# Patient Record
Sex: FEMALE | Race: BLACK OR AFRICAN AMERICAN | Employment: FULL TIME | URBAN - METROPOLITAN AREA
[De-identification: names, ages, dates, MRNs, and addresses within clinical notes are randomized per-mention and may not be internally consistent; named-entity substitution may affect disease eponyms.]

---

## 2021-03-26 ENCOUNTER — HOSPITAL ENCOUNTER (EMERGENCY)
Facility: CLINIC | Age: 46
Discharge: HOME | End: 2021-03-26
Attending: EMERGENCY MEDICINE | Admitting: EMERGENCY MEDICINE

## 2021-03-26 VITALS
OXYGEN SATURATION: 98 % | DIASTOLIC BLOOD PRESSURE: 102 MMHG | HEART RATE: 74 BPM | TEMPERATURE: 98 F | SYSTOLIC BLOOD PRESSURE: 160 MMHG | RESPIRATION RATE: 14 BRPM

## 2021-03-26 DIAGNOSIS — I16.0 HYPERTENSIVE URGENCY: ICD-10-CM

## 2021-03-26 LAB
ALBUMIN SERPL-MCNC: 4.3 G/DL (ref 3.4–5)
ALP SERPL-CCNC: 93 U/L (ref 40–150)
ALT SERPL W P-5'-P-CCNC: 24 U/L (ref 0–50)
ANION GAP SERPL CALCULATED.3IONS-SCNC: 3 MMOL/L (ref 3–14)
AST SERPL W P-5'-P-CCNC: 17 U/L (ref 0–45)
BASOPHILS # BLD AUTO: 0 10E9/L (ref 0–0.2)
BASOPHILS NFR BLD AUTO: 0.4 %
BILIRUB DIRECT SERPL-MCNC: 0.2 MG/DL (ref 0–0.2)
BILIRUB SERPL-MCNC: 0.7 MG/DL (ref 0.2–1.3)
BUN SERPL-MCNC: 9 MG/DL (ref 7–30)
CALCIUM SERPL-MCNC: 8.9 MG/DL (ref 8.5–10.1)
CHLORIDE SERPL-SCNC: 104 MMOL/L (ref 94–109)
CO2 SERPL-SCNC: 28 MMOL/L (ref 20–32)
CREAT SERPL-MCNC: 0.9 MG/DL (ref 0.52–1.04)
DIFFERENTIAL METHOD BLD: NORMAL
EOSINOPHIL # BLD AUTO: 0.2 10E9/L (ref 0–0.7)
EOSINOPHIL NFR BLD AUTO: 2.2 %
ERYTHROCYTE [DISTWIDTH] IN BLOOD BY AUTOMATED COUNT: 12.9 % (ref 10–15)
GFR SERPL CREATININE-BSD FRML MDRD: 75 ML/MIN/{1.73_M2}
GLUCOSE SERPL-MCNC: 112 MG/DL (ref 70–99)
HCT VFR BLD AUTO: 42.2 % (ref 35–47)
HGB BLD-MCNC: 14.2 G/DL (ref 11.7–15.7)
IMM GRANULOCYTES # BLD: 0 10E9/L (ref 0–0.4)
IMM GRANULOCYTES NFR BLD: 0.3 %
INTERPRETATION ECG - MUSE: NORMAL
LIPASE SERPL-CCNC: 115 U/L (ref 73–393)
LYMPHOCYTES # BLD AUTO: 3.5 10E9/L (ref 0.8–5.3)
LYMPHOCYTES NFR BLD AUTO: 35.4 %
MCH RBC QN AUTO: 32.3 PG (ref 26.5–33)
MCHC RBC AUTO-ENTMCNC: 33.6 G/DL (ref 31.5–36.5)
MCV RBC AUTO: 96 FL (ref 78–100)
MONOCYTES # BLD AUTO: 0.7 10E9/L (ref 0–1.3)
MONOCYTES NFR BLD AUTO: 6.7 %
NEUTROPHILS # BLD AUTO: 5.5 10E9/L (ref 1.6–8.3)
NEUTROPHILS NFR BLD AUTO: 55 %
NRBC # BLD AUTO: 0 10*3/UL
NRBC BLD AUTO-RTO: 0 /100
PLATELET # BLD AUTO: 332 10E9/L (ref 150–450)
POTASSIUM SERPL-SCNC: 3.5 MMOL/L (ref 3.4–5.3)
PROT SERPL-MCNC: 8.4 G/DL (ref 6.8–8.8)
RBC # BLD AUTO: 4.39 10E12/L (ref 3.8–5.2)
SODIUM SERPL-SCNC: 135 MMOL/L (ref 133–144)
TROPONIN I SERPL-MCNC: <0.015 UG/L (ref 0–0.04)
WBC # BLD AUTO: 9.9 10E9/L (ref 4–11)

## 2021-03-26 PROCEDURE — 83690 ASSAY OF LIPASE: CPT | Performed by: EMERGENCY MEDICINE

## 2021-03-26 PROCEDURE — 84484 ASSAY OF TROPONIN QUANT: CPT | Performed by: EMERGENCY MEDICINE

## 2021-03-26 PROCEDURE — 85025 COMPLETE CBC W/AUTO DIFF WBC: CPT | Performed by: EMERGENCY MEDICINE

## 2021-03-26 PROCEDURE — 96374 THER/PROPH/DIAG INJ IV PUSH: CPT

## 2021-03-26 PROCEDURE — 80076 HEPATIC FUNCTION PANEL: CPT | Performed by: EMERGENCY MEDICINE

## 2021-03-26 PROCEDURE — 96375 TX/PRO/DX INJ NEW DRUG ADDON: CPT

## 2021-03-26 PROCEDURE — 99284 EMERGENCY DEPT VISIT MOD MDM: CPT | Mod: 25

## 2021-03-26 PROCEDURE — 93005 ELECTROCARDIOGRAM TRACING: CPT

## 2021-03-26 PROCEDURE — 80048 BASIC METABOLIC PNL TOTAL CA: CPT | Performed by: EMERGENCY MEDICINE

## 2021-03-26 PROCEDURE — 250N000011 HC RX IP 250 OP 636: Performed by: EMERGENCY MEDICINE

## 2021-03-26 RX ORDER — LABETALOL 100 MG/1
100 TABLET, FILM COATED ORAL 2 TIMES DAILY
Qty: 60 TABLET | Refills: 0 | Status: SHIPPED | OUTPATIENT
Start: 2021-03-26

## 2021-03-26 RX ORDER — DEXAMETHASONE SODIUM PHOSPHATE 10 MG/ML
10 INJECTION, SOLUTION INTRAMUSCULAR; INTRAVENOUS ONCE
Status: COMPLETED | OUTPATIENT
Start: 2021-03-26 | End: 2021-03-26

## 2021-03-26 RX ORDER — ONDANSETRON 2 MG/ML
4 INJECTION INTRAMUSCULAR; INTRAVENOUS ONCE
Status: COMPLETED | OUTPATIENT
Start: 2021-03-26 | End: 2021-03-26

## 2021-03-26 RX ORDER — LABETALOL HYDROCHLORIDE 5 MG/ML
20 INJECTION, SOLUTION INTRAVENOUS ONCE
Status: COMPLETED | OUTPATIENT
Start: 2021-03-26 | End: 2021-03-26

## 2021-03-26 RX ORDER — DIPHENHYDRAMINE HYDROCHLORIDE 50 MG/ML
50 INJECTION INTRAMUSCULAR; INTRAVENOUS ONCE
Status: COMPLETED | OUTPATIENT
Start: 2021-03-26 | End: 2021-03-26

## 2021-03-26 RX ADMIN — DIPHENHYDRAMINE HYDROCHLORIDE 50 MG: 50 INJECTION, SOLUTION INTRAMUSCULAR; INTRAVENOUS at 07:50

## 2021-03-26 RX ADMIN — PROCHLORPERAZINE EDISYLATE 10 MG: 5 INJECTION INTRAMUSCULAR; INTRAVENOUS at 07:53

## 2021-03-26 RX ADMIN — LABETALOL HYDROCHLORIDE 20 MG: 5 INJECTION, SOLUTION INTRAVENOUS at 08:02

## 2021-03-26 RX ADMIN — DEXAMETHASONE SODIUM PHOSPHATE 10 MG: 10 INJECTION, SOLUTION INTRAMUSCULAR; INTRAVENOUS at 08:00

## 2021-03-26 RX ADMIN — ONDANSETRON 4 MG: 2 INJECTION INTRAMUSCULAR; INTRAVENOUS at 07:11

## 2021-03-26 ASSESSMENT — ENCOUNTER SYMPTOMS
SHORTNESS OF BREATH: 0
VOMITING: 1
NECK PAIN: 0
NAUSEA: 1
DIARRHEA: 1
ABDOMINAL PAIN: 0
NUMBNESS: 0
HEADACHES: 1

## 2021-03-26 NOTE — ED NOTES
Bed: ED26  Expected date: 3/26/21  Expected time: 6:45 AM  Means of arrival: Ambulance  Comments:  Riley 514 46F vomiting

## 2021-03-26 NOTE — ED PROVIDER NOTES
History     Chief Complaint:  Hypertension and Nausea & Vomiting     HPI:   Debra Corona is a 46 year old female who presents with headache, nausea, and vomiting. Patient states that she had an onset of a mild headache about 4 hours prior to arrival on her way to work. She ate a piece of toast but had a subsequent onset of nausea and vomiting with worsening headache. Patient describes this as a pressure in her sinuses at the front of her head. Patient also reports some diarrhea and is noted to be hypertensive here (211/122 at time of examination). She has a prescription for hydrochlorothiazide, however she has not been taking this. Denies any neck pain, visual disturbance, numbness/weakness, chest pain, pressure, shortness of breath, abdominal pain, leg swelling, chance of pregnancy, or history of headaches.    Review of Systems   Eyes: Negative for visual disturbance.   Respiratory: Negative for shortness of breath.    Cardiovascular: Negative for chest pain and leg swelling.   Gastrointestinal: Positive for diarrhea, nausea and vomiting. Negative for abdominal pain.   Musculoskeletal: Negative for neck pain.   Neurological: Positive for headaches. Negative for numbness.   All other systems reviewed and are negative.    Allergies:  No Known Allergies     Medications:    Losartan-hydrochlorothiazide     Past Medical History:    Hypertension     Past Surgical History:    Breast reduction  Cholecystectomy      Social History:  Smoking status: yes  Patient presents alone.  Lives with father as his caretaker.  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    Physical Exam     Vitals:  Patient Vitals for the past 24 hrs:   BP Temp Temp src Pulse Resp SpO2   03/26/21 1000 (!) 160/102 -- -- 74 14 98 %   03/26/21 0900 (!) 159/100 -- -- 66 14 97 %   03/26/21 0830 (!) 182/115 -- -- 66 9 99 %   03/26/21 0800 (!) 220/124 -- -- 90 14 100 %   03/26/21 0700 (!) 211/122 98  F (36.7  C) Oral 91 -- 99 %       Physical  Exam:  Constitutional: Heavy set black female. Occasionally trying to vomit.  HENT: No signs of trauma.   Eyes: EOM are normal. Pupils are equal, round, and reactive to light.   Neck: Normal range of motion. No JVD present. No cervical adenopathy.  Cardiovascular: Regular rhythm.  Exam reveals no gallop and no friction rub.  No carotid bruit.  No murmur heard.  Pulmonary/Chest: Bilateral breath sounds normal. No wheezes, rhonchi or rales.  Abdominal: Soft. No tenderness. No rebound or guarding.   Musculoskeletal: No edema. No tenderness.   Lymphadenopathy: No lymphadenopathy.   Neurological: Alert and oriented to person, place, and time. Normal strength. Coordination normal. Fluent speech. No facial asymmetry. Normal sensation.  Skin: Skin is warm and dry. No rash noted. No erythema.     Emergency Department Course     ECG:  Taken at 07:02:54.  Read by Sukhwinder Felix MD.    Rate 84 bpm. KS interval 172. QRS duration 72. QT/QTc 372/439. P-R-T axes 46 58 31.    NSR.    Laboratory:  CBC: WBC 9.9, HGB 14.2,      BMP: Glucose 112 o/w WNL (Creatinine 0.90)     Troponin (Collected 0701): <0.015    Hepatic panel: WNL    Lipase: 115    Emergency Department Course:  Reviewed:  Past medical records, Care Everywhere, nursing notes, and vitals reviewed.     Assessments:  0714 I performed an exam of the patient and obtained history, as documented above.  0948 Patient rechecked and updated.     Interventions:  0711 Zofran, 4 mg, IV  0750 Benadryl, 50 mg, IV  0753 Compazine, 10 mg, IV  0800 Decadron, 10 mg, IV  0802 Labetalol, 20 mg, IV    Disposition:  The patient was discharged to home.     Impression & Plan           Medical Decision Making:  Debra Corona is a 46 year old female who was on her way to work when she began feeling headache, nausea, and vomiting. She said the headache was initially mild but then got worse. She denies any vision problems, speech/swallowing issues, focal numbness/weakness. She has no  chest pain or shortness of breath. She called the ambulance and her blood pressure is very high. On exam, she is quite hypertensive with diastolic over 120. Neurologically intact. She was treated with headache medication including compazine and Benadryl and Decadron. She was also given labetalol for her blood pressure. Her blood pressure came down nicely. Still very  Minimal diastolic elevation. Her headache is completely gone and her nausea has improved. She can drink fluids and is no longer vomiting. Her symptoms seem consistent with hypertensive urgency. She had been off her diuretic. Her labs are unremarkable. Patient's symptoms havre completely resolved. I do not believe this is consistent with either subarachnoid hemorrhage, meningitis, or acute stroke. I will start the patient on labetalol, 100 mg twice a day. She should make a follow up with PCP to further check blood pressure in the next week.     Diagnosis:    ICD-10-CM    1. Hypertensive urgency  I16.0         Discharge Medications:  New Prescriptions    LABETALOL (NORMODYNE) 100 MG TABLET    Take 1 tablet (100 mg) by mouth 2 times daily       Scribe Disclosure:  I, Leigh Hernández, am serving as a scribe at 7:14 AM on 3/26/2021 to document services personally performed by Sukhwinder Felix MD based on my observations and the provider's statements to me.       Leigh Hernández  3/26/2021     Sukhwinder Felix MD  03/26/21 [id number]

## 2021-03-26 NOTE — ED TRIAGE NOTES
Has not taken hydrochlorothiazide in 3 weeks.  On the way to work she developed a headache.  She tried to eat some toast, but then got nauseas.  Vomited x5 today.  Hypertensive en route.